# Patient Record
Sex: MALE | Race: WHITE | Employment: OTHER | URBAN - METROPOLITAN AREA
[De-identification: names, ages, dates, MRNs, and addresses within clinical notes are randomized per-mention and may not be internally consistent; named-entity substitution may affect disease eponyms.]

---

## 2017-08-02 ENCOUNTER — HOSPITAL ENCOUNTER (OUTPATIENT)
Dept: PREADMISSION TESTING | Age: 77
Discharge: HOME | End: 2017-08-02
Payer: MEDICARE

## 2017-08-02 DIAGNOSIS — Z01.812 BLOOD TESTS PRIOR TO TREATMENT OR PROCEDURE: ICD-10-CM

## 2017-08-02 DIAGNOSIS — Z01.818 PREOP EXAMINATION: ICD-10-CM

## 2017-08-02 DIAGNOSIS — M75.41 IMPINGEMENT SYNDROME OF RIGHT SHOULDER: ICD-10-CM

## 2017-08-02 LAB
ATRIAL RATE: 83 BPM
CALCULATED P AXIS, ECG09: 51 DEGREES
CALCULATED R AXIS, ECG10: 14 DEGREES
CALCULATED T AXIS, ECG11: 22 DEGREES
DIAGNOSIS, 93000: NORMAL
HCT VFR BLD AUTO: 41.9 % (ref 36–48)
HGB BLD-MCNC: 14.7 G/DL (ref 13–16)
P-R INTERVAL, ECG05: 162 MS
POTASSIUM SERPL-SCNC: 3.9 MMOL/L (ref 3.5–5.5)
Q-T INTERVAL, ECG07: 354 MS
QRS DURATION, ECG06: 96 MS
QTC CALCULATION (BEZET), ECG08: 415 MS
VENTRICULAR RATE, ECG03: 83 BPM

## 2017-08-02 PROCEDURE — 84132 ASSAY OF SERUM POTASSIUM: CPT | Performed by: ORTHOPAEDIC SURGERY

## 2017-08-02 PROCEDURE — 85018 HEMOGLOBIN: CPT | Performed by: ORTHOPAEDIC SURGERY

## 2017-08-02 PROCEDURE — 85014 HEMATOCRIT: CPT | Performed by: ORTHOPAEDIC SURGERY

## 2017-08-02 PROCEDURE — 36415 COLL VENOUS BLD VENIPUNCTURE: CPT | Performed by: ORTHOPAEDIC SURGERY

## 2017-08-02 PROCEDURE — 93005 ELECTROCARDIOGRAM TRACING: CPT

## 2017-08-03 LAB
FAX TO INFO,FAXT: NORMAL
FAX TO NUMBER,FAXN: NORMAL

## 2019-02-15 ENCOUNTER — HOSPITAL ENCOUNTER (OUTPATIENT)
Dept: GENERAL RADIOLOGY | Age: 79
Discharge: HOME | End: 2019-02-15
Payer: MEDICARE

## 2019-02-15 DIAGNOSIS — M25.552 LEFT HIP PAIN: ICD-10-CM

## 2019-02-15 PROCEDURE — 73502 X-RAY EXAM HIP UNI 2-3 VIEWS: CPT

## 2019-10-21 ENCOUNTER — HOSPITAL ENCOUNTER (EMERGENCY)
Age: 79
Discharge: HOME | End: 2019-10-22
Attending: EMERGENCY MEDICINE | Admitting: EMERGENCY MEDICINE
Payer: MEDICARE

## 2019-10-21 ENCOUNTER — APPOINTMENT (OUTPATIENT)
Dept: GENERAL RADIOLOGY | Age: 79
End: 2019-10-21
Attending: EMERGENCY MEDICINE
Payer: MEDICARE

## 2019-10-21 DIAGNOSIS — R10.84 ABDOMINAL PAIN, GENERALIZED: Primary | ICD-10-CM

## 2019-10-21 LAB
ALBUMIN SERPL-MCNC: 4.2 G/DL (ref 3.4–5)
ALBUMIN/GLOB SERPL: 1.3 {RATIO} (ref 0.8–1.7)
ALP SERPL-CCNC: 66 U/L (ref 45–117)
ALT SERPL-CCNC: 37 U/L (ref 16–61)
ANION GAP SERPL CALC-SCNC: 7 MMOL/L (ref 3–18)
AST SERPL-CCNC: 26 U/L (ref 10–38)
BASOPHILS # BLD: 0.1 K/UL (ref 0–0.1)
BASOPHILS NFR BLD: 1 % (ref 0–2)
BILIRUB SERPL-MCNC: 0.5 MG/DL (ref 0.2–1)
BNP SERPL-MCNC: 138 PG/ML (ref 0–1800)
BUN SERPL-MCNC: 27 MG/DL (ref 7–18)
BUN/CREAT SERPL: 22 (ref 12–20)
CALCIUM SERPL-MCNC: 8.9 MG/DL (ref 8.5–10.1)
CHLORIDE SERPL-SCNC: 106 MMOL/L (ref 100–111)
CK MB CFR SERPL CALC: 2.1 % (ref 0–4)
CK MB SERPL-MCNC: 2.2 NG/ML (ref 5–25)
CK SERPL-CCNC: 107 U/L (ref 39–308)
CO2 SERPL-SCNC: 27 MMOL/L (ref 21–32)
CREAT SERPL-MCNC: 1.25 MG/DL (ref 0.6–1.3)
DIFFERENTIAL METHOD BLD: ABNORMAL
EOSINOPHIL # BLD: 0.2 K/UL (ref 0–0.4)
EOSINOPHIL NFR BLD: 2 % (ref 0–5)
ERYTHROCYTE [DISTWIDTH] IN BLOOD BY AUTOMATED COUNT: 13.5 % (ref 11.6–14.5)
GLOBULIN SER CALC-MCNC: 3.2 G/DL (ref 2–4)
GLUCOSE SERPL-MCNC: 110 MG/DL (ref 74–99)
HCT VFR BLD AUTO: 43.4 % (ref 36–48)
HGB BLD-MCNC: 14.6 G/DL (ref 13–16)
LYMPHOCYTES # BLD: 3.1 K/UL (ref 0.9–3.6)
LYMPHOCYTES NFR BLD: 36 % (ref 21–52)
MCH RBC QN AUTO: 33 PG (ref 24–34)
MCHC RBC AUTO-ENTMCNC: 33.6 G/DL (ref 31–37)
MCV RBC AUTO: 98 FL (ref 74–97)
MONOCYTES # BLD: 0.9 K/UL (ref 0.05–1.2)
MONOCYTES NFR BLD: 11 % (ref 3–10)
NEUTS SEG # BLD: 4.4 K/UL (ref 1.8–8)
NEUTS SEG NFR BLD: 50 % (ref 40–73)
PLATELET # BLD AUTO: 193 K/UL (ref 135–420)
PMV BLD AUTO: 9.5 FL (ref 9.2–11.8)
POTASSIUM SERPL-SCNC: 4.2 MMOL/L (ref 3.5–5.5)
PROT SERPL-MCNC: 7.4 G/DL (ref 6.4–8.2)
RBC # BLD AUTO: 4.43 M/UL (ref 4.7–5.5)
SODIUM SERPL-SCNC: 140 MMOL/L (ref 136–145)
TROPONIN I SERPL-MCNC: <0.02 NG/ML (ref 0–0.04)
WBC # BLD AUTO: 8.7 K/UL (ref 4.6–13.2)

## 2019-10-21 PROCEDURE — 93005 ELECTROCARDIOGRAM TRACING: CPT

## 2019-10-21 PROCEDURE — 80053 COMPREHEN METABOLIC PANEL: CPT

## 2019-10-21 PROCEDURE — 82550 ASSAY OF CK (CPK): CPT

## 2019-10-21 PROCEDURE — 85025 COMPLETE CBC W/AUTO DIFF WBC: CPT

## 2019-10-21 PROCEDURE — 71045 X-RAY EXAM CHEST 1 VIEW: CPT

## 2019-10-21 PROCEDURE — 99285 EMERGENCY DEPT VISIT HI MDM: CPT

## 2019-10-21 PROCEDURE — 83880 ASSAY OF NATRIURETIC PEPTIDE: CPT

## 2019-10-22 ENCOUNTER — APPOINTMENT (OUTPATIENT)
Dept: CT IMAGING | Age: 79
End: 2019-10-22
Attending: EMERGENCY MEDICINE
Payer: MEDICARE

## 2019-10-22 VITALS
RESPIRATION RATE: 16 BRPM | SYSTOLIC BLOOD PRESSURE: 142 MMHG | DIASTOLIC BLOOD PRESSURE: 73 MMHG | OXYGEN SATURATION: 99 % | HEART RATE: 84 BPM | WEIGHT: 190 LBS | BODY MASS INDEX: 30.67 KG/M2 | TEMPERATURE: 98.3 F

## 2019-10-22 LAB
LIPASE SERPL-CCNC: 106 U/L (ref 73–393)
TROPONIN I SERPL-MCNC: <0.02 NG/ML (ref 0–0.04)

## 2019-10-22 PROCEDURE — 84484 ASSAY OF TROPONIN QUANT: CPT

## 2019-10-22 PROCEDURE — 71275 CT ANGIOGRAPHY CHEST: CPT

## 2019-10-22 PROCEDURE — 74011636320 HC RX REV CODE- 636/320: Performed by: EMERGENCY MEDICINE

## 2019-10-22 PROCEDURE — 74011250637 HC RX REV CODE- 250/637: Performed by: EMERGENCY MEDICINE

## 2019-10-22 PROCEDURE — 83690 ASSAY OF LIPASE: CPT

## 2019-10-22 RX ORDER — DICYCLOMINE HYDROCHLORIDE 10 MG/1
20 CAPSULE ORAL ONCE
Status: COMPLETED | OUTPATIENT
Start: 2019-10-22 | End: 2019-10-22

## 2019-10-22 RX ORDER — ACETAMINOPHEN 500 MG
1000 TABLET ORAL ONCE
Status: COMPLETED | OUTPATIENT
Start: 2019-10-22 | End: 2019-10-22

## 2019-10-22 RX ORDER — DICYCLOMINE HYDROCHLORIDE 20 MG/1
20 TABLET ORAL EVERY 6 HOURS
Qty: 20 TAB | Refills: 0 | Status: SHIPPED | OUTPATIENT
Start: 2019-10-22 | End: 2019-10-27

## 2019-10-22 RX ADMIN — DICYCLOMINE HYDROCHLORIDE 20 MG: 10 CAPSULE ORAL at 02:17

## 2019-10-22 RX ADMIN — ACETAMINOPHEN 1000 MG: 500 TABLET ORAL at 02:17

## 2019-10-22 RX ADMIN — IOPAMIDOL 100 ML: 755 INJECTION, SOLUTION INTRAVENOUS at 00:13

## 2019-10-22 NOTE — DISCHARGE INSTRUCTIONS
Thank you for allowing us to participate in your care. Please call your primary care doctor to schedule a follow up appointment as soon as possible. You should schedule any additional follow up appointments as directed in your discharge paperwork. If you are having difficulty connecting to the care or services you need please ask us about our  program.    Please return to the ER if your symptoms persist, get worse, or if you have other concerns. We are always available to re-evaluate you and to make sure you are doing OK!

## 2019-10-22 NOTE — ED PROVIDER NOTES
EMERGENCY DEPARTMENT HISTORY AND PHYSICAL EXAM    Date: 10/21/2019  Patient Name: Leeanna Hernández    History of Presenting Illness     Chief Complaint   Patient presents with    Chest Pain         History Provided By: Patient and Patient's Wife    1142 PM  Leeanna Hernández is a 78 y.o. male with PMHX of hypertension, chronic back pain, arthritis who presents to the emergency department C/O abdominal pain and back pain. Patient states symptoms started acutely at 10:30 PM tonight while sitting in chair. Described as a bandlike pain that radiates from the center of his abdomen around both sides of his upper abdomen to his back. He is also staying left subscapular pain. No prior episodes. He denies chest pain, trouble breathing, diaphoresis, nausea, vomiting. He is compliant with medications. PCP: Angy Kenney MD    Current Outpatient Medications   Medication Sig Dispense Refill    dicyclomine (BENTYL) 20 mg tablet Take 1 Tab by mouth every six (6) hours for 20 doses. 20 Tab 0    baclofen (LIORESAL) 10 mg tablet Take 10 mg by mouth every evening.  hydrocodone-acetaminophen (NORCO) 5-325 mg per tablet Take 1 tablet by mouth every eight (8) hours as needed for Pain. Indications: PAIN      valsartan-hydrochlorothiazide (DIOVAN HCT) 160-25 mg per tablet Take 1 Tab by mouth every evening. Takes in the afternoon. Indications: HYPERTENSION      pravastatin (PRAVACHOL) 80 mg tablet Take 80 mg by mouth nightly. Takes in the afternoon. Indications: HYPERCHOLESTEROLEMIA      allopurinol (ZYLOPRIM) 300 mg tablet Take 300 mg by mouth nightly. Indications: GOUT      zolpidem (AMBIEN) 10 mg tablet Take 5 mg by mouth nightly as needed. 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     Hypercholesterolemia     Hypertension        Past Surgical History:  Past Surgical History:   Procedure Laterality Date    HX CATARACT REMOVAL      right eye    HX HERNIA REPAIR      inguinal- bilateral    HX LUMBAR FUSION  12    HX MOHS PROCEDURES      both shoulders    HX TONSILLECTOMY         Family History:  Family History   Problem Relation Age of Onset    Malignant Hyperthermia Neg Hx     Pseudocholinesterase Deficiency Neg Hx     Delayed Awakening Neg Hx     Post-op Nausea/Vomiting Neg Hx     Emergence Delirium Neg Hx     Post-op Cognitive Dysfunction Neg Hx     Other Neg Hx        Social History:  Social History     Tobacco Use    Smoking status: Former Smoker     Last attempt to quit: 2013     Years since quittin.5    Smokeless tobacco: Never Used   Substance Use Topics    Alcohol use: Yes     Comment: one beer every 2-3 weeks    Drug use: No       Allergies:  No Known Allergies      Review of Systems   Review of Systems   Constitutional: Negative for chills and fever. Respiratory: Negative for cough, chest tightness and shortness of breath. Cardiovascular: Negative for chest pain, palpitations and leg swelling. Gastrointestinal: Positive for abdominal pain. Negative for blood in stool, diarrhea, nausea and vomiting. Genitourinary: Negative for difficulty urinating and dysuria. Musculoskeletal: Positive for arthralgias and back pain. Neurological: Negative for weakness, light-headedness and headaches. Hematological: Negative for adenopathy. Does not bruise/bleed easily.          Physical Exam     Vitals:    10/22/19 0130 10/22/19 0200 10/22/19 0215 10/22/19 0245   BP: 138/68 148/89 143/75 142/73   Pulse: 76 79 81 84   Resp: 18 17 20 16   Temp:       SpO2: 98% 98% 99% 99%   Weight:         Physical Exam    Nursing notes and vital signs reviewed    Constitutional: Non toxic appearing, no acute distress  Head: Normocephalic, Atraumatic  Eyes: Pupils are equal, round, and reactive to light, EOMI  Neck: Supple  Cardiovascular: Regular rate and rhythm, no murmurs, rubs, or gallops  Chest: Normal work of breathing and chest excursion bilaterally  Lungs: Clear to ausculation bilaterally  Abdomen: Soft, non tender, non distended, normoactive bowel sounds, no palpable or pulsatile mass  Back: No evidence of trauma or deformity  Extremities: No evidence of trauma, no LE edema  Skin: Warm and dry, normal cap refill  Neuro: Alert and appropriate, CN intact, normal speech, strength and sensation full and symmetric bilaterally, normal gait, normal coordination  Psychiatric: Normal mood and affect      Diagnostic Study Results     Labs -     Recent Results (from the past 12 hour(s))   EKG, 12 LEAD, INITIAL    Collection Time: 10/21/19 11:08 PM   Result Value Ref Range    Ventricular Rate 78 BPM    Atrial Rate 78 BPM    P-R Interval 156 ms    QRS Duration 130 ms    Q-T Interval 384 ms    QTC Calculation (Bezet) 437 ms    Calculated P Axis 16 degrees    Calculated R Axis -15 degrees    Calculated T Axis -18 degrees    Diagnosis       Normal sinus rhythm  Right bundle branch block  Abnormal ECG  When compared with ECG of 02-AUG-2017 14:59,  Right bundle branch block is now present     CBC WITH AUTOMATED DIFF    Collection Time: 10/21/19 11:12 PM   Result Value Ref Range    WBC 8.7 4.6 - 13.2 K/uL    RBC 4.43 (L) 4.70 - 5.50 M/uL    HGB 14.6 13.0 - 16.0 g/dL    HCT 43.4 36.0 - 48.0 %    MCV 98.0 (H) 74.0 - 97.0 FL    MCH 33.0 24.0 - 34.0 PG    MCHC 33.6 31.0 - 37.0 g/dL    RDW 13.5 11.6 - 14.5 %    PLATELET 097 975 - 143 K/uL    MPV 9.5 9.2 - 11.8 FL    NEUTROPHILS 50 40 - 73 %    LYMPHOCYTES 36 21 - 52 %    MONOCYTES 11 (H) 3 - 10 %    EOSINOPHILS 2 0 - 5 %    BASOPHILS 1 0 - 2 %    ABS. NEUTROPHILS 4.4 1.8 - 8.0 K/UL    ABS. LYMPHOCYTES 3.1 0.9 - 3.6 K/UL    ABS. MONOCYTES 0.9 0.05 - 1.2 K/UL    ABS. EOSINOPHILS 0.2 0.0 - 0.4 K/UL    ABS.  BASOPHILS 0.1 0.0 - 0.1 K/UL    DF AUTOMATED     CARDIAC PANEL,(CK, CKMB & TROPONIN)    Collection Time: 10/21/19 11:12 PM   Result Value Ref Range     39 - 308 U/L    CK - MB 2.2 <3.6 ng/ml    CK-MB Index 2.1 0.0 - 4.0 %    Troponin-I, QT <0.02 0.0 - 0.244 NG/ML   METABOLIC PANEL, COMPREHENSIVE    Collection Time: 10/21/19 11:12 PM   Result Value Ref Range    Sodium 140 136 - 145 mmol/L    Potassium 4.2 3.5 - 5.5 mmol/L    Chloride 106 100 - 111 mmol/L    CO2 27 21 - 32 mmol/L    Anion gap 7 3.0 - 18 mmol/L    Glucose 110 (H) 74 - 99 mg/dL    BUN 27 (H) 7.0 - 18 MG/DL    Creatinine 1.25 0.6 - 1.3 MG/DL    BUN/Creatinine ratio 22 (H) 12 - 20      GFR est AA >60 >60 ml/min/1.73m2    GFR est non-AA 56 (L) >60 ml/min/1.73m2    Calcium 8.9 8.5 - 10.1 MG/DL    Bilirubin, total 0.5 0.2 - 1.0 MG/DL    ALT (SGPT) 37 16 - 61 U/L    AST (SGOT) 26 10 - 38 U/L    Alk. phosphatase 66 45 - 117 U/L    Protein, total 7.4 6.4 - 8.2 g/dL    Albumin 4.2 3.4 - 5.0 g/dL    Globulin 3.2 2.0 - 4.0 g/dL    A-G Ratio 1.3 0.8 - 1.7     NT-PRO BNP    Collection Time: 10/21/19 11:12 PM   Result Value Ref Range    NT pro- 0 - 1,800 PG/ML   TROPONIN I    Collection Time: 10/22/19  2:08 AM   Result Value Ref Range    Troponin-I, QT <0.02 0.0 - 0.045 NG/ML   LIPASE    Collection Time: 10/22/19  2:08 AM   Result Value Ref Range    Lipase 106 73 - 393 U/L       Radiologic Studies -   CTA CHEST ABD PELV W WO CONT   Final Result   IMPRESSION:       1. No aortic aneurysm, aortic dissection, or pulmonary embolism. 2. No acute radiographic abnormality involving the chest, abdomen, or pelvis to   explain the patient's clinical symptomatology. XR CHEST PORT   Final Result   IMPRESSION: No active cardiopulmonary disease. CT Results  (Last 48 hours)               10/22/19 0033  CTA CHEST ABD PELV W WO CONT Final result    Impression:  IMPRESSION:        1. No aortic aneurysm, aortic dissection, or pulmonary embolism.        2. No acute radiographic abnormality involving the chest, abdomen, or pelvis to   explain the patient's clinical symptomatology. Narrative:  EXAM: CTA of the chest, abdomen, and pelvis       CLINICAL INDICATION/HISTORY: Central abdominal pain radiating around flanks and   to the left shoulder     > Additional: None. COMPARISON: None       TECHNIQUE: Axial CT imaging of the chest, abdomen, and pelvis was performed   without and with intravenous contrast.  Multiplanar and maximum intensity   projection reformats were generated. One or more dose reduction techniques were   used on this CT: automated exposure control, adjustment of the mAs and/or kVp   according to patient size, and iterative reconstruction techniques. The   specific techniques used on this CT exam have been documented in the patient's   electronic medical record. Digital Imaging and Communications in Medicine (DICOM) format image data are   available to nonaffiliated external healthcare facilities or entities on a   secure, media free, reciprocally searchable basis with patient authorization for   at least a 12 month period after this study. _______________       FINDINGS:       CHEST:       AORTA: The noncontrast portion of the examination is without intramural   hematoma. The aorta is normal in size and contour without aneurysmal dilation or   dissection. Minimal intrathoracic aortic atherosclerosis. BASE OF NECK: Normal.       MEDIASTINUM: Three vessel aortic arch. There is no pulmonary arterial filling   defect. There is scant coronary artery disease. Normal heart size. No evidence   of right heart strain. No pericardial effusion. LYMPH NODES: No enlarged lymph nodes. AIRWAY: Normal.       LUNGS: No suspicious nodule or mass. No abnormal opacities. PLEURA: Normal.       BONES: No acute or aggressive osseous abnormalities identified.        OTHER:  None.       ===============       ABDOMEN/PELVIS:       LIVER, BILIARY: Scattered subcentimeter low attenuating lesions which are too   small to characterize but typically benign. No biliary dilation. All bladder is   contracted. PANCREAS: Normal.       SPLEEN: Normal.       ADRENALS: Normal.       KIDNEYS/URETERS/BLADDER: Kidneys enhance symmetrically. There are bilateral   benign renal cysts. Largest within the left interpolar kidney measuring 3.8 cm   in size and extending into the hilum splaying some of the arterial vasculature. No hydroureteronephrosis. Normal bladder. PELVIC ORGANS: Prostate is enlarged. VASCULATURE: The aorta is normal in size and contour without aneurysmal dilation   or dissection. There is very mild atherosclerosis. LYMPH NODES: No enlarged lymph nodes. GASTROINTESTINAL TRACT: No bowel dilation or wall thickening. Is diverticulosis   without findings of diverticulitis. There is a sliding hiatal hernia. BONES: No acute or aggressive osseous abnormalities identified. There is   posterior kathryn and pedicular screw fusion hardware present from L3-S1. There is   grade 1 retrolisthesis of L3 on L4. There is multilevel disc degeneration. OTHER: Fat-containing left inguinal hernia.       _______________               CXR Results  (Last 48 hours)               10/21/19 2338  XR CHEST PORT Final result    Impression:  IMPRESSION: No active cardiopulmonary disease. Narrative:  EXAM: Normal chest radiograph       CLINICAL INDICATION/HISTORY: Chest pain      --Additional history: None. COMPARISON: 04/15/2013       TECHNIQUE: Frontal view of the chest       _______________       FINDINGS:       SUPPORT DEVICES: None. HEART AND MEDIASTINUM: Cardiac silhouette is normal in size. Normal mediastinal   contours . Normal pulmonary vasculature. LUNGS AND PLEURAL SPACES: No focal airspace opacity. Sharp costophrenic sulci. No pneumothoraces.        BONY THORAX AND SOFT TISSUES: Unremarkable.       _______________               CXR:  No acute pathology my read    Medications given in the ED-  Medications   iopamidol (ISOVUE-370) 76 % injection 100 mL (100 mL IntraVENous Given 10/22/19 0013)   dicyclomine (BENTYL) capsule 20 mg (20 mg Oral Given 10/22/19 0217)   acetaminophen (TYLENOL) tablet 1,000 mg (1,000 mg Oral Given 10/22/19 0217)         Medical Decision Making   I am the first provider for this patient. I reviewed the vital signs, available nursing notes, past medical history, past surgical history, family history and social history. Vital Signs-Reviewed the patient's vital signs. Pulse Oximetry Analysis - 99% on ra     Cardiac Monitor:  Rate: 85 bpm  Rhythm: nsr    EKG interpretation: (Preliminary)  EKG read by Dr. Stephane Gil at 2310  Normal sinus rhythm, right bundle branch block which is new compared to prior study in August 2017    Records Reviewed: Nursing Notes, Old Medical Records, Previous electrocardiograms and Previous Laboratory Studies    Provider Notes (Medical Decision Making): Paul Bowie is a 78 y.o. male with acute onset abdominal and back pain. Symptoms are unable to be reproduced by palpation. I was unable to visualize abdominal aorta bedside ultrasound due to increased bowel gas. Will get CTA concern for vascular catastrophe. Bedside ultrasound did not demonstrate any free fluid. Procedures:  Bedside US  Date/Time: 10/21/2019 11:59 PM  Performed by: Len Hernandez MD  Authorized by: Len Hernandez MD     Written consent obtained: Yes    Type of procedure: Focused abdominal aorta  Indications:  Abdominal pain and back pain  Proximal transverse: Incomplete  Distal transverse:  Not obtained  Sagittal:  Inadequate  Bifurcation:  Not visulaized  Aneurysm: indeterminate    Sonographic Evidence for Aneurysm:  Indeterminate  Left eye:     Confirmation study:  A confirmatory study was obtained while the patient was in the [facility].   Bedside US  Date/Time: 10/21/2019 11:59 PM  Performed by: Regina Lozano MD  Authorized by: Regina Lozano MD     Type of procedure:  FAST  Indications:  Abdominal pain  Hepatorenal:  Adequate  Perisplenic:  Adequate  Suprapubic:  Adequate  Pericardial:  Adequate  Hepatorenal free fluid: absent    Perisplenic free fluid: absent    Suprapubic free fluid: absent    Pericardial effusion: absent    Peritoneal free fluid: absent    Pericardial effusion: absent    Left eye:     Confirmation study:  A confirmatory study [facility] ED Course:   1:20 AM  Re-evaluation of patient and he is pain-free. He does state when he takes a deep breath he feels some intercostal/pleuretic pain. CTA negative for aortic pathology, PE, intra-abdominal pathology. Not sure about patient's episode of band-like abd  pain. I doubt anginal equivalent. Have chronic back pain is been doing physical therapy twice a week. Possibly musculoskeletal in origin. I will repeat troponin a second time. Patient does have new right bundle branch block from prior records. I will refer patient to cardiology. Lengthy conversation with patient and his wife about his concerning presentation and thus far negative work-up. Patient and his wife prefer to go home and have appropriate outpatient follow-up.    3:13 AM  Second troponin negative. Discussed with patient and his wife do not have an adequate explanation for patient's discomfort. Patient will prefer to go home. He states he will take a Flexeril which has helped some with pain in the past and will help him sleep. Patient and his wife both understand they should return to emergency department if symptoms persist or get worse and we will reevaluate him. Diagnosis and Disposition     Critical Care:     DISCHARGE NOTE:    Marycarmen Vieira's  results have been reviewed with him. He has been counseled regarding his diagnosis, treatment, and plan.   He verbally conveys understanding and agreement of the signs, symptoms, diagnosis, treatment and prognosis and additionally agrees to follow up as discussed. He also agrees with the care-plan and conveys that all of his questions have been answered. I have also provided discharge instructions for him that include: educational information regarding their diagnosis and treatment, and list of reasons why they would want to return to the ED prior to their follow-up appointment, should his condition change. He has been provided with education for proper emergency department utilization. CLINICAL IMPRESSION:    1. Abdominal pain, generalized        PLAN:  1. D/C Home  2. Discharge Medication List as of 10/22/2019  3:03 AM      START taking these medications    Details   dicyclomine (BENTYL) 20 mg tablet Take 1 Tab by mouth every six (6) hours for 20 doses. , Normal, Disp-20 Tab, R-0         CONTINUE these medications which have NOT CHANGED    Details   baclofen (LIORESAL) 10 mg tablet Take 10 mg by mouth every evening., Historical Med      hydrocodone-acetaminophen (NORCO) 5-325 mg per tablet Take 1 tablet by mouth every eight (8) hours as needed for Pain. Indications: PAIN, Historical Med      valsartan-hydrochlorothiazide (DIOVAN HCT) 160-25 mg per tablet Take 1 Tab by mouth every evening. Takes in the afternoon. Indications: HYPERTENSION, Historical Med      pravastatin (PRAVACHOL) 80 mg tablet Take 80 mg by mouth nightly. Takes in the afternoon. Indications: HYPERCHOLESTEROLEMIAHistorical Med, 80 mg      allopurinol (ZYLOPRIM) 300 mg tablet Take 300 mg by mouth nightly. Indications: GOUTHistorical Med, 300 mg      zolpidem (AMBIEN) 10 mg tablet Take 5 mg by mouth nightly as needed. Indications: SLEEP-ONSET INSOMNIAHistorical Med, 5 mg           3.    Follow-up Information     Follow up With Specialties Details Why Contact Info    Lakisha Mercedes MD [facility] In 1 day  [address] [id number] [phone]      Justine Henning MD [facility], Internal Medicine Schedule an appointment as soon as possible for a visit in 2 days  [address]  [address] [place] [address] [facility]      THE [facility] EMERGENCY DEPT Emergency Medicine Go to  If symptoms worsen [address] [place] [id number]  [phone]        _______________________________      Please note that this dictation was completed with [employer], the computer voice recognition software. Quite often unanticipated grammatical, syntax, homophones, and other interpretive errors are inadvertently transcribed by the computer software. Please disregard these errors. Please excuse any errors that have escaped final proofreading.

## 2019-10-22 NOTE — ED NOTES
Pt requesting medication for his chest pain. States it is the same pain that he has had since his arrival to ED. Dr Candelario Au notified. Orders received.  Medicated as ordered

## 2019-10-22 NOTE — ED NOTES
Pt states he starting to have some relief of discomfort. Pt aao. resp unlabored and even. Skin w/d/p. Given verbal and written d/c instructions.  Pt ambulated from ED w/o incident

## 2019-10-24 LAB
ATRIAL RATE: 78 BPM
CALCULATED P AXIS, ECG09: 16 DEGREES
CALCULATED R AXIS, ECG10: -15 DEGREES
CALCULATED T AXIS, ECG11: -18 DEGREES
DIAGNOSIS, 93000: NORMAL
P-R INTERVAL, ECG05: 156 MS
Q-T INTERVAL, ECG07: 384 MS
QRS DURATION, ECG06: 130 MS
QTC CALCULATION (BEZET), ECG08: 437 MS
VENTRICULAR RATE, ECG03: 78 BPM

## 2021-09-09 ENCOUNTER — HOSPITAL ENCOUNTER (EMERGENCY)
Age: 81
Discharge: HOME | End: 2021-09-09
Attending: EMERGENCY MEDICINE
Payer: MEDICARE

## 2021-09-09 ENCOUNTER — APPOINTMENT (OUTPATIENT)
Dept: GENERAL RADIOLOGY | Age: 81
End: 2021-09-09
Attending: EMERGENCY MEDICINE
Payer: MEDICARE

## 2021-09-09 VITALS
SYSTOLIC BLOOD PRESSURE: 145 MMHG | RESPIRATION RATE: 16 BRPM | TEMPERATURE: 98.4 F | HEIGHT: 66 IN | OXYGEN SATURATION: 95 % | HEART RATE: 66 BPM | DIASTOLIC BLOOD PRESSURE: 81 MMHG | WEIGHT: 190 LBS | BODY MASS INDEX: 30.53 KG/M2

## 2021-09-09 DIAGNOSIS — M54.50 ACUTE BILATERAL LOW BACK PAIN WITHOUT SCIATICA: Primary | ICD-10-CM

## 2021-09-09 LAB
APPEARANCE UR: CLEAR
BILIRUB UR QL: NEGATIVE
COLOR UR: YELLOW
GLUCOSE UR STRIP.AUTO-MCNC: NEGATIVE MG/DL
HGB UR QL STRIP: NEGATIVE
KETONES UR QL STRIP.AUTO: NEGATIVE MG/DL
LEUKOCYTE ESTERASE UR QL STRIP.AUTO: NEGATIVE
NITRITE UR QL STRIP.AUTO: NEGATIVE
PH UR STRIP: 5 [PH] (ref 5–8)
PROT UR STRIP-MCNC: NEGATIVE MG/DL
SP GR UR REFRACTOMETRY: 1.01 (ref 1–1.03)
UROBILINOGEN UR QL STRIP.AUTO: 0.2 EU/DL (ref 0.2–1)

## 2021-09-09 PROCEDURE — 74011250637 HC RX REV CODE- 250/637: Performed by: EMERGENCY MEDICINE

## 2021-09-09 PROCEDURE — 81003 URINALYSIS AUTO W/O SCOPE: CPT

## 2021-09-09 PROCEDURE — 72110 X-RAY EXAM L-2 SPINE 4/>VWS: CPT

## 2021-09-09 PROCEDURE — 99283 EMERGENCY DEPT VISIT LOW MDM: CPT

## 2021-09-09 RX ORDER — PREDNISONE 10 MG/1
TABLET ORAL
Qty: 1 DOSE PACK | Refills: 0 | Status: SHIPPED | OUTPATIENT
Start: 2021-09-09 | End: 2021-09-09 | Stop reason: SDUPTHER

## 2021-09-09 RX ORDER — LIDOCAINE 50 MG/G
PATCH TOPICAL
Qty: 15 EACH | Refills: 0 | Status: SHIPPED | OUTPATIENT
Start: 2021-09-09

## 2021-09-09 RX ORDER — PREDNISONE 10 MG/1
TABLET ORAL
Qty: 1 DOSE PACK | Refills: 0 | Status: SHIPPED | OUTPATIENT
Start: 2021-09-09 | End: 2022-01-08

## 2021-09-09 RX ORDER — LIDOCAINE 50 MG/G
PATCH TOPICAL
Qty: 15 EACH | Refills: 0 | Status: SHIPPED | OUTPATIENT
Start: 2021-09-09 | End: 2021-09-09 | Stop reason: SDUPTHER

## 2021-09-09 RX ORDER — HYDROCODONE BITARTRATE AND ACETAMINOPHEN 5; 325 MG/1; MG/1
1 TABLET ORAL
Status: COMPLETED | OUTPATIENT
Start: 2021-09-09 | End: 2021-09-09

## 2021-09-09 RX ADMIN — HYDROCODONE BITARTRATE AND ACETAMINOPHEN 1 TABLET: 5; 325 TABLET ORAL at 14:37

## 2021-09-09 NOTE — ED PROVIDER NOTES
EMERGENCY DEPARTMENT HISTORY AND PHYSICAL EXAM    Date: 2021  Patient Name: Evelin Colón    History of Presenting Illness     Chief Complaint   Patient presents with    Back Pain         History Provided By: Patient and Patient's Wife    12:08 PM  Evelin Colón is a 80 y.o. 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 Per patient since Tuesday last week, approximately 9 days ago he has been having constant spasms in his low back. No clear relieving or exacerbating factors identified. Reports the weakness in his leg is at baseline. No new numbness, fever, chest pain, shortness of breath, vomiting, bowel or urinary complaints. Noted he had some abdominal pain last week and went to an outside emergency department where he had a CT of the abdomen and pelvis that was negative. He reports his pain management doctor performed an MRI within the past month that did not have acute process. Does not take any blood thinners. Is taken some ibuprofen and some old muscle relaxers that are  with minimal relief. PCP: Kiana Piña MD    Current Outpatient Medications   Medication Sig Dispense Refill    predniSONE (STERAPRED DS) 10 mg dose pack 6 day dose pack per package instructions 1 Dose Pack 0    lidocaine (Lidoderm) 5 % Apply patch to the affected area for 12 hours a day and remove for 12 hours a day. 15 Each 0    baclofen (LIORESAL) 10 mg tablet Take 10 mg by mouth every evening.  hydrocodone-acetaminophen (NORCO) 5-325 mg per tablet Take 1 tablet by mouth every eight (8) hours as needed for Pain. Indications: PAIN      valsartan-hydrochlorothiazide (DIOVAN HCT) 160-25 mg per tablet Take 1 Tab by mouth every evening. Takes in the afternoon. Indications: HYPERTENSION      pravastatin (PRAVACHOL) 80 mg tablet Take 80 mg by mouth nightly. Takes in the afternoon.    Indications: HYPERCHOLESTEROLEMIA      allopurinol (ZYLOPRIM) 300 mg tablet Take 300 mg by mouth nightly. Indications: GOUT      zolpidem (AMBIEN) 10 mg tablet Take 5 mg by mouth nightly as needed. Indications: SLEEP-ONSET INSOMNIA         Past History     Past Medical History:  Past Medical History:   Diagnosis Date    Arthritis     Cancer ([address]     skin    Congenital anomaly of lower limb     spastic condition of the left leg.  Hypercholesterolemia     Hypertension        Past Surgical History:  Past Surgical History:   Procedure Laterality Date    HX CATARACT REMOVAL      right eye    HX HERNIA REPAIR      inguinal- bilateral    HX LUMBAR FUSION  12    HX MOHS PROCEDURES      both shoulders    HX TONSILLECTOMY         Family History:  Family History   Problem Relation Age of Onset    Malignant Hyperthermia Neg Hx     Pseudocholinesterase Deficiency Neg Hx     Delayed Awakening Neg Hx     Post-op Nausea/Vomiting Neg Hx     Emergence Delirium Neg Hx     Post-op Cognitive Dysfunction Neg Hx     Other Neg Hx        Social History:  Social History     Tobacco Use    Smoking status: Former Smoker     Quit date: 2013     Years since quittin.4    Smokeless tobacco: Never Used   Substance Use Topics    Alcohol use: Yes     Comment: one beer every 2-3 weeks    Drug use: No       Allergies:  No Known Allergies      Review of Systems   Review of Systems   Constitutional: Negative for fever. Respiratory: Negative for shortness of breath. Cardiovascular: Negative for chest pain. Gastrointestinal: Negative for abdominal pain. Musculoskeletal: Positive for back pain. All other systems reviewed and are negative.         Physical Exam     Vitals:    21 1122   BP: (!) 124/52   Pulse: 66   Resp: 16   Temp: 98.4 °F (36.9 °C)   SpO2: 98%   Weight: 86.2 kg (190 lb)   Height: 5' 6\" (1.676 m)     Physical Exam    Nursing notes and vital signs reviewed    Constitutional: Non toxic, elderly appearing, moderate distress  Head: Normocephalic, Atraumatic  Eyes: EOMI  Neck: Supple  Cardiovascular: Regular rate and rhythm, no murmurs, rubs, or gallops  Chest: Normal work of breathing and chest excursion bilaterally  Lungs: Clear to ausculation bilaterally  Abdomen: Soft, non tender, non distended, normoactive bowel sounds  Back: No evidence of trauma or deformity, no bony point tenderness to palpation, well-healed old surgical scar, diffuse tenderness across the lower back  Extremities: Atrophy of the left leg from congenital spasticity but neurovascularly intact in both lower extremities  Skin: Warm and dry, normal cap refill  Neuro: Alert and appropriate, ambulates with cane at baseline  Psychiatric: Normal mood and affect      Diagnostic Study Results     Labs -     Recent Results (from the past 12 hour(s))   URINALYSIS W/ RFLX MICROSCOPIC    Collection Time: 09/09/21  2:20 PM   Result Value Ref Range    Color YELLOW      Appearance CLEAR      Specific gravity 1.014 1.005 - 1.030      pH (UA) 5.0 5.0 - 8.0      Protein Negative NEG mg/dL    Glucose Negative NEG mg/dL    Ketone Negative NEG mg/dL    Bilirubin Negative NEG      Blood Negative NEG      Urobilinogen 0.2 0.2 - 1.0 EU/dL    Nitrites Negative NEG      Leukocyte Esterase Negative NEG         Radiologic Studies -   XR SPINE LUMB MIN 4 V   Final Result   No acute osseous or hardware abnormality identified. CT Results  (Last 48 hours)    None        CXR Results  (Last 48 hours)    None          Medications given in the ED-  Medications   HYDROcodone-acetaminophen (NORCO) 5-325 mg per tablet 1 Tablet (1 Tablet Oral Given 9/9/21 1437)         Medical Decision Making   I am the first provider for this patient. I reviewed the vital signs, available nursing notes, past medical history, past surgical history, family history and social history. Vital Signs-Reviewed the patient's vital signs.     Pulse Oximetry Analysis - 98% on room air, not hypoxic     Records Reviewed: Nursing Notes    Provider Notes (Medical Decision Making): Devendra Toscano is a 80 y.o. male presenting for acute on chronic low back pain. Neurovascularly at patient's baseline. Had recent CT abdomen pelvis and MRI of spine without acute process. Hemodynamically stable. UA and lumbar x-rays without acute process. Will treat symptomatically with steroids and lidocaine patch and discharged with referral to primary care and spine specialist for follow-up with return precautions. Patient and his wife understand and agree with this plan. Procedures:  Procedures    ED Course:   2:47 PM  Updated patient on all results and plan. All questions answered. Diagnosis and Disposition     Critical Care: None    DISCHARGE NOTE:    Dashawn Vieira's  results have been reviewed with him. He has been counseled regarding his diagnosis, treatment, and plan. He verbally conveys understanding and agreement of the signs, symptoms, diagnosis, treatment and prognosis and additionally agrees to follow up as discussed. He also agrees with the care-plan and conveys that all of his questions have been answered. I have also provided discharge instructions for him that include: educational information regarding their diagnosis and treatment, and list of reasons why they would want to return to the ED prior to their follow-up appointment, should his condition change. He has been provided with education for proper emergency department utilization. CLINICAL IMPRESSION:    1. Acute bilateral low back pain without sciatica        PLAN:  1. D/C Home  2. Current Discharge Medication List      START taking these medications    Details   predniSONE (STERAPRED DS) 10 mg dose pack 6 day dose pack per package instructions  Qty: 1 Dose Pack, Refills: 0  Start date: 9/9/2021      lidocaine (Lidoderm) 5 % Apply patch to the affected area for 12 hours a day and remove for 12 hours a day.   Qty: 15 Each, Refills: 0  Start date: 9/9/2021           3. Follow-up Information     Follow up With Specialties Details Why Contact Info    Fauzia Mary MD Family Medicine Schedule an appointment as soon as possible for a visit   [facility] [address]      Saud Greene MD Orthopedic Surgery Schedule an appointment as soon as possible for a visit   [place] [id number]      THE [facility] EMERGENCY DEPT Emergency Medicine  If symptoms worsen [address] [place] [address]  [phone]        _______________________________      Please note that this dictation was completed with [employer], the computer voice recognition software. Quite often unanticipated grammatical, syntax, homophones, and other interpretive errors are inadvertently transcribed by the computer software. Please disregard these errors. Please excuse any errors that have escaped final proofreading.

## 2022-01-08 ENCOUNTER — APPOINTMENT (OUTPATIENT)
Dept: CT IMAGING | Age: 82
End: 2022-01-08
Attending: PHYSICIAN ASSISTANT
Payer: MEDICARE

## 2022-01-08 ENCOUNTER — HOSPITAL ENCOUNTER (EMERGENCY)
Age: 82
Discharge: HOME | End: 2022-01-08
Attending: EMERGENCY MEDICINE
Payer: MEDICARE
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
## 2022-01-08 DIAGNOSIS — S01.01XA LACERATION OF SCALP, INITIAL ENCOUNTER: ICD-10-CM

## 2022-01-08 DIAGNOSIS — Z23 NEED FOR TETANUS BOOSTER: ICD-10-CM

## 2022-01-08 PROCEDURE — 99283 EMERGENCY DEPT VISIT LOW MDM: CPT

## 2022-01-08 PROCEDURE — 77030008462 HC STPLR SKN PROX J&J -A

## 2022-01-08 PROCEDURE — 90471 IMMUNIZATION ADMIN: CPT

## 2022-01-08 PROCEDURE — 90715 TDAP VACCINE 7 YRS/> IM: CPT | Performed by: PHYSICIAN ASSISTANT

## 2022-01-08 PROCEDURE — 75810000293 HC SIMP/SUPERF WND  RPR

## 2022-01-08 PROCEDURE — 74011250636 HC RX REV CODE- 250/636: Performed by: PHYSICIAN ASSISTANT

## 2022-01-08 PROCEDURE — 70450 CT HEAD/BRAIN W/O DYE: CPT

## 2022-01-08 RX ORDER — LIDOCAINE HYDROCHLORIDE AND EPINEPHRINE 10; 10 MG/ML; UG/ML
15 INJECTION, SOLUTION INFILTRATION; PERINEURAL ONCE
Status: DISCONTINUED | OUTPATIENT
Start: 2022-01-08 | End: 2022-01-08 | Stop reason: HOSPADM

## 2022-01-08 RX ORDER — VALSARTAN 40 MG/1
40 TABLET ORAL DAILY
COMMUNITY

## 2022-01-08 RX ORDER — HYDROCHLOROTHIAZIDE 25 MG/1
25 TABLET ORAL DAILY
COMMUNITY

## 2022-01-08 RX ORDER — METHOTREXATE 2.5 MG/1
TABLET ORAL
COMMUNITY

## 2022-01-08 RX ADMIN — TETANUS TOXOID, REDUCED DIPHTHERIA TOXOID AND ACELLULAR PERTUSSIS VACCINE, ADSORBED 0.5 ML: 5; 2.5; 8; 8; 2.5 SUSPENSION INTRAMUSCULAR at 17:08

## 2022-01-08 NOTE — ED PROVIDER NOTES
EMERGENCY DEPARTMENT HISTORY AND PHYSICAL EXAM    Date: 1/8/2022  Patient Name: Jeane Trujillo    History of Presenting Illness     Chief Complaint   Patient presents with    Fall    Laceration         History Provided By: Patient    Chief Complaint: fall lac      Additional History (Context):   4:27 PM  Jeane Trujillo is a 80 y.o. male with PMHX hypertension, high cholesterol, cerebral palsy presents to the emergency department C/O fall. Patient tripped over a bath rug and hit his head on a rail of the shower door. Denies loss of consciousness. No generalized headache but does have some tenderness at the laceration site. No dizziness blurred vision. No neck pain back pain or pain to the chest abdomen or extremities. Denies blood thinner use. Unsure of tetanus status. PCP: None    Current Outpatient Medications   Medication Sig Dispense Refill    valsartan (DIOVAN) 40 mg tablet Take 40 mg by mouth daily.  hydroCHLOROthiazide (HYDRODIURIL) 25 mg tablet Take 25 mg by mouth daily.  methotrexate (RHEUMATREX) 2.5 mg tablet Take  by mouth every Monday.  lidocaine (Lidoderm) 5 % Apply patch to the affected area for 12 hours a day and remove for 12 hours a day. 15 Each 0    valsartan-hydrochlorothiazide (DIOVAN HCT) 160-25 mg per tablet Take 1 Tab by mouth every evening. Takes in the afternoon. Indications: HYPERTENSION      pravastatin (PRAVACHOL) 80 mg tablet Take 80 mg by mouth nightly. Takes in the afternoon. Indications: HYPERCHOLESTEROLEMIA      allopurinol (ZYLOPRIM) 300 mg tablet Take 300 mg by mouth nightly. Indications: GOUT         Past History     Past Medical History:  Past Medical History:   Diagnosis Date    Arthritis     Cancer [address] [address]     skin    Congenital anomaly of lower limb     spastic condition of the left leg.     [facility])     Hypercholesterolemia     Hypertension        Past Surgical History:  Past Surgical History:   Procedure Laterality Date  HX CATARACT REMOVAL      right eye    HX HERNIA REPAIR      inguinal- bilateral    HX LUMBAR FUSION  12    HX MOHS PROCEDURES      both shoulders    HX TONSILLECTOMY         Family History:  Family History   Problem Relation Age of Onset    Malignant Hyperthermia Neg Hx     Pseudocholinesterase Deficiency Neg Hx     Delayed Awakening Neg Hx     Post-op Nausea/Vomiting Neg Hx     Emergence Delirium Neg Hx     Post-op Cognitive Dysfunction Neg Hx     Other Neg Hx        Social History:  Social History     Tobacco Use    Smoking status: Former Smoker     Quit date: 2013     Years since quittin.7    Smokeless tobacco: Never Used   Substance Use Topics    Alcohol use: Yes     Comment: one beer every 2-3 weeks    Drug use: No       Allergies:  No Known Allergies    Review of Systems   Review of Systems   Constitutional: Negative for chills and fever. Eyes: Negative for visual disturbance. Respiratory: Negative for shortness of breath. Cardiovascular: Negative for chest pain. Gastrointestinal: Negative for abdominal pain, diarrhea, nausea and vomiting. Musculoskeletal: Negative for back pain, neck pain and neck stiffness. Skin: Positive for wound. Neurological: Negative for weakness and numbness. All other systems reviewed and are negative. Physical Exam     Vitals:    22 1557   BP: (P) 127/68   Pulse: (!) (P) 104   Resp: (P) 16   Temp: (P) 98.1 °F (36.7 °C)   SpO2: (P) 99%   Weight: (P) 83.9 kg (185 lb)   Height: (P) 5' 6\" (1.676 m)     Physical Exam  Vitals and nursing note reviewed. Constitutional:       Appearance: He is well-developed. Comments: Well-appearing nontoxic no acute distress   HENT:      Head: Normocephalic and atraumatic. Comments: No merino signs or raccoon eyes, no hemotympanum seen bilaterally, no bony instability crepitus or step-off  Eyes:      Extraocular Movements: Extraocular movements intact.       Pupils: Pupils are equal, round, and reactive to light. Cardiovascular:      Rate and Rhythm: Normal rate and regular rhythm. Heart sounds: Normal heart sounds. No murmur heard. Pulmonary:      Effort: Pulmonary effort is normal. No respiratory distress. Breath sounds: Normal breath sounds. No wheezing or rales. Abdominal:      General: Bowel sounds are normal.      Palpations: Abdomen is soft. Tenderness: There is no abdominal tenderness. Musculoskeletal:      Cervical back: Normal range of motion and neck supple. Neurological:      Mental Status: He is alert and oriented to person, place, and time. Psychiatric:         Judgment: Judgment normal.         Diagnostic Study Results     Labs:   No results found for this or any previous visit (from the past 12 hour(s)). Radiologic Studies:  CT HEAD WO CONT   Final Result      No acute intracranial abnormalities. CT Results  (Last 48 hours)               01/08/22 1702  CT HEAD WO CONT Final result    Impression:      No acute intracranial abnormalities. Narrative:  EXAM: CT head       INDICATION: Head injury       COMPARISON: None. TECHNIQUE: Axial CT imaging of the head was performed without intravenous   contrast. One or more dose reduction techniques were used on this CT: automated   exposure control, adjustment of the mAs and/or kVp according to patient size,   and iterative reconstruction techniques. The specific techniques used on this   CT exam have been documented in the patient's electronic medical record. Digital   Imaging and Communications in Medicine (DICOM) format image data are available   to nonaffiliated external healthcare facilities or entities on a secure, media   free, reciprocally searchable basis with patient authorization for at least a   12-month period after this study.        _______________       FINDINGS:       BRAIN AND POSTERIOR FOSSA: The sulci, folia, ventricles and basal cisterns are   within normal limits for the patient's age. There is no intracranial hemorrhage,   mass effect, or midline shift. There are no areas of abnormal parenchymal   attenuation. A prominent cisterna magna. EXTRA-AXIAL SPACES AND MENINGES: There are no abnormal extra-axial fluid   collections. CALVARIUM: Intact. SINUSES: There is an 18 mm polyp and/or retention cyst the right maxillary   sinus. OTHER: None.       _______________               CXR Results  (Last 48 hours)    None          Medical Decision Making   I am the first provider for this patient. I reviewed the vital signs, available nursing notes, past medical history, past surgical history, family history and social history. Vital Signs: Reviewed the patient's vital signs. Pulse Oximetry Analysis: 99% on RA       Records Reviewed: Nursing Notes and Old Medical Records    Procedures:  Wound Repair    Date/Time: 1/9/2022 3:48 PM  [facility]: Aleks Vaughan  Preparation: skin prepped with Shur-Clens  Pre-procedure re-eval: Immediately prior to the procedure, the patient was reevaluated and found suitable for the planned procedure and any planned medications. Time out: Immediately prior to the procedure a time out was called to verify the correct patient, procedure, equipment, staff and marking as appropriate. .  Location details: scalp  Wound length:2.6 - 7.5 cm (5)  Anesthesia: local infiltration    Anesthesia:  Local Anesthetic: lidocaine 1% with epinephrine  Foreign bodies: no foreign bodies  Irrigation solution: saline  Irrigation method: syringe  Debridement: none  Skin closure: staples  Number of sutures: 12  Approximation: close  Patient tolerance: patient tolerated the procedure well with no immediate complications  My total time at bedside, performing this procedure was 1-15 minutes. ED Course:   4:27 PM Initial assessment performed.  The patients presenting problems have been discussed, and they are in agreement with the care plan formulated and outlined with them. I have encouraged them to ask questions as they arise throughout their visit. Discussion:  Pt presents with fall with head injury and scalp laceration. Tetanus was updated. Wound repaired with staples. Head CT shows no intracranial bleed or skull fracture. Will have patient return for staple removal in 5 days. . Strict return precautions given, pt offering no questions or complaints. Diagnosis and Disposition     DISCHARGE NOTE:  Alli Vieira's  results have been reviewed with him. He has been counseled regarding his diagnosis, treatment, and plan. He verbally conveys understanding and agreement of the signs, symptoms, diagnosis, treatment and prognosis and additionally agrees to follow up as discussed. He also agrees with the care-plan and conveys that all of his questions have been answered. I have also provided discharge instructions for him that include: educational information regarding their diagnosis and treatment, and list of reasons why they would want to return to the ED prior to their follow-up appointment, should his condition change. He has been provided with education for proper emergency department utilization. CLINICAL IMPRESSION:    1. Fall, initial encounter    2. Laceration of scalp, initial encounter    3. Need for tetanus booster        PLAN:  1. D/C Home  2. Discharge Medication List as of 1/8/2022  5:41 PM        3. Follow-up Information     Follow up With Specialties Details Why Contact Info    [facility]   Or urgent care or your primary doctor in 5 days for staple removal [address] [facility], [address] [facility] [address]    [facility] EMERGENCY DEPT Emergency Medicine  If symptoms worsen [address] [place] [address] [phone]                 Please note that this dictation was completed with [email or web address], the [employer] voice recognition software.   Quite often unanticipated grammatical, syntax, homophones, and other interpretive errors are inadvertently transcribed by the computer software. Please disregard these errors. Please excuse any errors that have escaped final proofreading.

## 2024-03-29 ENCOUNTER — HOSPITAL ENCOUNTER (EMERGENCY)
Facility: HOSPITAL | Age: 84
Discharge: HOME | End: 2024-03-30
Attending: EMERGENCY MEDICINE
Payer: MEDICARE

## 2024-03-29 ENCOUNTER — APPOINTMENT (OUTPATIENT)
Facility: HOSPITAL | Age: 84
End: 2024-03-29
Payer: MEDICARE

## 2024-03-29 DIAGNOSIS — J18.9 PNEUMONIA OF RIGHT MIDDLE LOBE DUE TO INFECTIOUS ORGANISM: ICD-10-CM

## 2024-03-29 DIAGNOSIS — U07.1 COVID-19: Primary | ICD-10-CM

## 2024-03-29 LAB
ALBUMIN SERPL-MCNC: 3.8 G/DL (ref 3.4–5)
ALBUMIN/GLOB SERPL: 1.4 (ref 0.8–1.7)
ALP SERPL-CCNC: 51 U/L (ref 45–117)
ALT SERPL-CCNC: 50 U/L (ref 16–61)
ANION GAP SERPL CALC-SCNC: 11 MMOL/L (ref 3–18)
AST SERPL-CCNC: 38 U/L (ref 10–38)
BASOPHILS # BLD: 0 K/UL (ref 0–0.1)
BASOPHILS NFR BLD: 0 % (ref 0–2)
BILIRUB SERPL-MCNC: 0.9 MG/DL (ref 0.2–1)
BUN SERPL-MCNC: 16 MG/DL (ref 7–18)
BUN/CREAT SERPL: 15 (ref 12–20)
CALCIUM SERPL-MCNC: 8.9 MG/DL (ref 8.5–10.1)
CHLORIDE SERPL-SCNC: 103 MMOL/L (ref 100–111)
CO2 SERPL-SCNC: 23 MMOL/L (ref 21–32)
CREAT SERPL-MCNC: 1.06 MG/DL (ref 0.6–1.3)
DIFFERENTIAL METHOD BLD: ABNORMAL
EOSINOPHIL # BLD: 0 K/UL (ref 0–0.4)
EOSINOPHIL NFR BLD: 0 % (ref 0–5)
ERYTHROCYTE [DISTWIDTH] IN BLOOD BY AUTOMATED COUNT: 12.7 % (ref 11.6–14.5)
FLUAV RNA SPEC QL NAA+PROBE: NOT DETECTED
FLUBV RNA SPEC QL NAA+PROBE: NOT DETECTED
GLOBULIN SER CALC-MCNC: 2.8 G/DL (ref 2–4)
GLUCOSE SERPL-MCNC: 152 MG/DL (ref 74–99)
HCT VFR BLD AUTO: 34.8 % (ref 36–48)
HGB BLD-MCNC: 12 G/DL (ref 13–16)
IMM GRANULOCYTES # BLD AUTO: 0 K/UL (ref 0–0.04)
IMM GRANULOCYTES NFR BLD AUTO: 0 % (ref 0–0.5)
LACTATE SERPL-SCNC: 1.8 MMOL/L (ref 0.4–2)
LYMPHOCYTES # BLD: 1.3 K/UL (ref 0.9–3.6)
LYMPHOCYTES NFR BLD: 18 % (ref 21–52)
MAGNESIUM SERPL-MCNC: 1.6 MG/DL (ref 1.6–2.6)
MCH RBC QN AUTO: 32.7 PG (ref 24–34)
MCHC RBC AUTO-ENTMCNC: 34.5 G/DL (ref 31–37)
MCV RBC AUTO: 94.8 FL (ref 78–100)
MONOCYTES # BLD: 0.5 K/UL (ref 0.05–1.2)
MONOCYTES NFR BLD: 7 % (ref 3–10)
NEUTS SEG # BLD: 5.3 K/UL (ref 1.8–8)
NEUTS SEG NFR BLD: 75 % (ref 40–73)
NRBC # BLD: 0 K/UL (ref 0–0.01)
NRBC BLD-RTO: 0 PER 100 WBC
PLATELET # BLD AUTO: 146 K/UL (ref 135–420)
PMV BLD AUTO: 9.7 FL (ref 9.2–11.8)
POTASSIUM SERPL-SCNC: 4.5 MMOL/L (ref 3.5–5.5)
PROT SERPL-MCNC: 6.6 G/DL (ref 6.4–8.2)
RBC # BLD AUTO: 3.67 M/UL (ref 4.35–5.65)
SARS-COV-2 RNA RESP QL NAA+PROBE: DETECTED
SODIUM SERPL-SCNC: 137 MMOL/L (ref 136–145)
WBC # BLD AUTO: 7.1 K/UL (ref 4.6–13.2)

## 2024-03-29 PROCEDURE — 83605 ASSAY OF LACTIC ACID: CPT

## 2024-03-29 PROCEDURE — 6370000000 HC RX 637 (ALT 250 FOR IP): Performed by: EMERGENCY MEDICINE

## 2024-03-29 PROCEDURE — 2580000003 HC RX 258: Performed by: EMERGENCY MEDICINE

## 2024-03-29 PROCEDURE — 6360000002 HC RX W HCPCS: Performed by: EMERGENCY MEDICINE

## 2024-03-29 PROCEDURE — 85025 COMPLETE CBC W/AUTO DIFF WBC: CPT

## 2024-03-29 PROCEDURE — 99284 EMERGENCY DEPT VISIT MOD MDM: CPT

## 2024-03-29 PROCEDURE — 87040 BLOOD CULTURE FOR BACTERIA: CPT

## 2024-03-29 PROCEDURE — 80053 COMPREHEN METABOLIC PANEL: CPT

## 2024-03-29 PROCEDURE — 71045 X-RAY EXAM CHEST 1 VIEW: CPT

## 2024-03-29 PROCEDURE — 83735 ASSAY OF MAGNESIUM: CPT

## 2024-03-29 PROCEDURE — 96365 THER/PROPH/DIAG IV INF INIT: CPT

## 2024-03-29 PROCEDURE — 87636 SARSCOV2 & INF A&B AMP PRB: CPT

## 2024-03-29 RX ORDER — AZITHROMYCIN 250 MG/1
TABLET, FILM COATED ORAL
Qty: 6 TABLET | Refills: 0 | Status: SHIPPED | OUTPATIENT
Start: 2024-03-29 | End: 2024-04-08

## 2024-03-29 RX ORDER — AMOXICILLIN AND CLAVULANATE POTASSIUM 875; 125 MG/1; MG/1
1 TABLET, FILM COATED ORAL 2 TIMES DAILY
Qty: 14 TABLET | Refills: 0 | Status: SHIPPED | OUTPATIENT
Start: 2024-03-29 | End: 2024-04-05

## 2024-03-29 RX ORDER — 0.9 % SODIUM CHLORIDE 0.9 %
1000 INTRAVENOUS SOLUTION INTRAVENOUS ONCE
Status: COMPLETED | OUTPATIENT
Start: 2024-03-29 | End: 2024-03-30

## 2024-03-29 RX ORDER — ACETAMINOPHEN 325 MG/1
650 TABLET ORAL ONCE
Status: COMPLETED | OUTPATIENT
Start: 2024-03-29 | End: 2024-03-29

## 2024-03-29 RX ADMIN — ACETAMINOPHEN 650 MG: 325 TABLET ORAL at 21:11

## 2024-03-29 RX ADMIN — AZITHROMYCIN MONOHYDRATE 500 MG: 500 INJECTION, POWDER, LYOPHILIZED, FOR SOLUTION INTRAVENOUS at 23:10

## 2024-03-29 RX ADMIN — CEFTRIAXONE 1000 MG: 1 INJECTION, POWDER, FOR SOLUTION INTRAMUSCULAR; INTRAVENOUS at 23:07

## 2024-03-29 RX ADMIN — SODIUM CHLORIDE 1000 ML: 9 INJECTION, SOLUTION INTRAVENOUS at 21:12

## 2024-03-29 ASSESSMENT — LIFESTYLE VARIABLES
HOW MANY STANDARD DRINKS CONTAINING ALCOHOL DO YOU HAVE ON A TYPICAL DAY: PATIENT DOES NOT DRINK
HOW OFTEN DO YOU HAVE A DRINK CONTAINING ALCOHOL: NEVER

## 2024-03-29 ASSESSMENT — PAIN - FUNCTIONAL ASSESSMENT
PAIN_FUNCTIONAL_ASSESSMENT: 0-10
PAIN_FUNCTIONAL_ASSESSMENT: NONE - DENIES PAIN

## 2024-03-29 ASSESSMENT — PAIN SCALES - GENERAL: PAINLEVEL_OUTOF10: 8

## 2024-03-29 ASSESSMENT — PAIN DESCRIPTION - DESCRIPTORS: DESCRIPTORS: SORE

## 2024-03-29 ASSESSMENT — PAIN DESCRIPTION - LOCATION: LOCATION: THROAT

## 2024-03-29 NOTE — ED PROVIDER NOTES
Constitutional: Well nourished, well developed, appears stated age. Alert and oriented.  HEENT: Neck supple without meningismus. PERRLA, no conjunctival injection. EOM intact  Cardiovascular: RRR, Warm, well-perfused extremities  Respiratory: CTAB, Unlabored respiratory effort  Abdominal: Soft, nontender, nondistended, no CVA tenderness  Musculoskeletal: Full range of motion all extremities. No deformities. No peripheral edema.  Skin: Warm, dry. No rashes  Vascular: Pulses equal in all 4 extremities.  Neuro: CNs II-XII grossly intact. Sensation and motor function intact.  Psych: Appropriate mood and affect.    Diagnostic Study Results     LABS:  Recent Results (from the past 24 hour(s))   COVID-19 & Influenza Combo    Collection Time: 03/29/24  8:00 PM    Specimen: Nasopharyngeal   Result Value Ref Range    SARS-CoV-2, PCR Detected (A) NOTD      Rapid Influenza A By PCR Not detected NOTD      Rapid Influenza B By PCR Not detected NOTD     Comprehensive Metabolic Panel    Collection Time: 03/29/24  8:00 PM   Result Value Ref Range    Sodium 137 136 - 145 mmol/L    Potassium 4.5 3.5 - 5.5 mmol/L    Chloride 103 100 - 111 mmol/L    CO2 23 21 - 32 mmol/L    Anion Gap 11 3.0 - 18 mmol/L    Glucose 152 (H) 74 - 99 mg/dL    BUN 16 7.0 - 18 MG/DL    Creatinine 1.06 0.6 - 1.3 MG/DL    Bun/Cre Ratio 15 12 - 20      Est, Glom Filt Rate 70 >60 ml/min/1.73m2    Calcium 8.9 8.5 - 10.1 MG/DL    Total Bilirubin 0.9 0.2 - 1.0 MG/DL    ALT 50 16 - 61 U/L    AST 38 10 - 38 U/L    Alk Phosphatase 51 45 - 117 U/L    Total Protein 6.6 6.4 - 8.2 g/dL    Albumin 3.8 3.4 - 5.0 g/dL    Globulin 2.8 2.0 - 4.0 g/dL    Albumin/Globulin Ratio 1.4 0.8 - 1.7     Magnesium    Collection Time: 03/29/24  8:00 PM   Result Value Ref Range    Magnesium 1.6 1.6 - 2.6 mg/dL   Lactic Acid    Collection Time: 03/29/24  9:15 PM   Result Value Ref Range    Lactic Acid, Plasma 1.8 0.4 - 2.0 MMOL/L   CBC with Auto Differential    Collection Time: 03/29/24  for this patient. Initial assessment performed. I reviewed the vital signs, available nursing notes, past medical history, past surgical history, family history and social history. The patients presenting problems have been discussed, and they are in agreement with the care plan formulated and outlined with them. I have encouraged them to ask questions as they arise throughout their visit.    My differential diagnosis on first evaluation of the patient included but was not limited to URI. Overall he appeared very well and clinically only had a cold. However due to fever and age as well as an abundance of caution on my part, I ordered imaging and labs. I did examine his feet due to his diabetes history and he had no sign of infection there.    Vital signs on arrival were stable although his temperature was mildly elevated 100.0 °F.     Covid test is positive. CBC and BMP show no acute abnormalities. Lactate is normal. CXR shows right lower lobe pneumonia.    Admission was considered due to his age and pneumonia however overall he felt very well and had no shortness of breath or hypoxia.  Oxygen saturation remained 95% and above with ambulation with a heart rate  only in the 80s.  I will give him a dose of IV antibiotics prior to discharge. Positive and negative test results were discussed. My clinical assessment and recommendations were discussed. They agree with the plan of discharge.  All questions were answered and they are in agreement with plan.    RECORDS REVIEWED: Nursing Notes    Is this patient to be included in the SEP-1 core measure? No Exclusion criteria - the patient is NOT to be included for SEP-1 Core Measure due to: Viral etiology found or highly suspected (including COVID-19) without concomitant bacterial infection    MEDICATIONS ADMINISTERED IN THE ED:  Medications   sodium chloride 0.9 % bolus 1,000 mL (0 mLs IntraVENous Stopped 3/30/24 0100)   acetaminophen (TYLENOL) tablet 650 mg (650 mg Oral

## 2024-03-30 VITALS
TEMPERATURE: 100 F | OXYGEN SATURATION: 96 % | SYSTOLIC BLOOD PRESSURE: 120 MMHG | DIASTOLIC BLOOD PRESSURE: 60 MMHG | WEIGHT: 190 LBS | BODY MASS INDEX: 30.53 KG/M2 | HEIGHT: 66 IN | HEART RATE: 73 BPM | RESPIRATION RATE: 18 BRPM

## 2024-03-30 LAB
APPEARANCE UR: CLEAR
BILIRUB UR QL: NEGATIVE
COLOR UR: YELLOW
GLUCOSE UR STRIP.AUTO-MCNC: 100 MG/DL
HGB UR QL STRIP: NEGATIVE
KETONES UR QL STRIP.AUTO: 15 MG/DL
LEUKOCYTE ESTERASE UR QL STRIP.AUTO: NEGATIVE
NITRITE UR QL STRIP.AUTO: NEGATIVE
PH UR STRIP: 6 (ref 5–8)
PROT UR STRIP-MCNC: NEGATIVE MG/DL
SP GR UR REFRACTOMETRY: 1.01 (ref 1–1.03)
UROBILINOGEN UR QL STRIP.AUTO: 0.2 EU/DL (ref 0.2–1)

## 2024-03-30 PROCEDURE — 81003 URINALYSIS AUTO W/O SCOPE: CPT

## 2024-03-30 ASSESSMENT — PAIN - FUNCTIONAL ASSESSMENT: PAIN_FUNCTIONAL_ASSESSMENT: NONE - DENIES PAIN

## 2024-03-30 NOTE — ED NOTES
Patient A/O with NAD noted; needs addressed; wife at bedside; placed on monitor; will continue to monitor

## 2024-03-30 NOTE — DISCHARGE INSTRUCTIONS
Thank you for visiting the emergency department today.  As we discussed you have COVID which is a viral infection.  I am prescribing you Paxlovid.  This medication does have interactions with other medications so please discuss with your pharmacist.  Your chest x-ray also showed evidence of bacterial pneumonia infection.  I am prescribing antibiotics for this.  If you have any worsening symptoms please return to the emergency department.

## 2024-03-30 NOTE — ED NOTES
Patient A/O with NAD noted; wife at bedside; needs addressed; abx still going; will continue to monitor

## 2024-03-31 LAB
BACTERIA SPEC CULT: NORMAL
BACTERIA SPEC CULT: NORMAL
SERVICE CMNT-IMP: NORMAL
SERVICE CMNT-IMP: NORMAL
